# Patient Record
Sex: MALE | Race: AMERICAN INDIAN OR ALASKA NATIVE | URBAN - METROPOLITAN AREA
[De-identification: names, ages, dates, MRNs, and addresses within clinical notes are randomized per-mention and may not be internally consistent; named-entity substitution may affect disease eponyms.]

---

## 2017-04-10 ENCOUNTER — TRANSFERRED RECORDS (OUTPATIENT)
Dept: HEALTH INFORMATION MANAGEMENT | Facility: CLINIC | Age: 27
End: 2017-04-10

## 2017-05-09 ENCOUNTER — TRANSFERRED RECORDS (OUTPATIENT)
Dept: HEALTH INFORMATION MANAGEMENT | Facility: CLINIC | Age: 27
End: 2017-05-09

## 2017-05-10 ENCOUNTER — TRANSFERRED RECORDS (OUTPATIENT)
Dept: HEALTH INFORMATION MANAGEMENT | Facility: CLINIC | Age: 27
End: 2017-05-10

## 2017-06-06 ENCOUNTER — TRANSFERRED RECORDS (OUTPATIENT)
Dept: HEALTH INFORMATION MANAGEMENT | Facility: CLINIC | Age: 27
End: 2017-06-06

## 2017-06-29 ENCOUNTER — MEDICAL CORRESPONDENCE (OUTPATIENT)
Dept: HEALTH INFORMATION MANAGEMENT | Facility: CLINIC | Age: 27
End: 2017-06-29

## 2017-06-29 ENCOUNTER — TRANSFERRED RECORDS (OUTPATIENT)
Dept: HEALTH INFORMATION MANAGEMENT | Facility: CLINIC | Age: 27
End: 2017-06-29

## 2017-07-12 ENCOUNTER — PRE VISIT (OUTPATIENT)
Dept: OTOLARYNGOLOGY | Facility: CLINIC | Age: 27
End: 2017-07-12

## 2017-07-12 NOTE — TELEPHONE ENCOUNTER
1.  Date/reason for appt:  7/18/17   Tympanic Membrane    2.  Referring provider:  Dr Killian, [facility]    3.  Call to patient (Yes / No - short description):  No, referred    4.  Previous care at / records requested from:  [facility] Records/Imaging

## 2017-07-13 NOTE — TELEPHONE ENCOUNTER
Records received from [facility].   Included  Office notes: 5/17/17    Missing/needed records: additional records, audiogram and images

## 2017-07-14 NOTE — TELEPHONE ENCOUNTER
Records received from [facility].   Included  Office notes: 11/25/16, 4/11/17, 5/10/17, 5/9/17, 6/6/17  Radiology reports: CT IAC and Temporal bones on 5/10/17  Other: audiogram on 5/9/17

## 2017-07-17 DIAGNOSIS — H90.8 MIXED HEARING LOSS: Primary | ICD-10-CM

## 2017-07-18 ENCOUNTER — OFFICE VISIT (OUTPATIENT)
Dept: AUDIOLOGY | Facility: CLINIC | Age: 27
End: 2017-07-18

## 2017-07-18 ENCOUNTER — OFFICE VISIT (OUTPATIENT)
Dept: OTOLARYNGOLOGY | Facility: CLINIC | Age: 27
End: 2017-07-18

## 2017-07-18 VITALS — HEIGHT: 76 IN | WEIGHT: 315 LBS | BODY MASS INDEX: 38.36 KG/M2

## 2017-07-18 DIAGNOSIS — H90.8 MIXED CONDUCTIVE AND SENSORINEURAL HEARING LOSS, UNSPECIFIED LATERALITY: Primary | ICD-10-CM

## 2017-07-18 DIAGNOSIS — H90.12 CONDUCTIVE HEARING LOSS OF LEFT EAR WITH UNRESTRICTED HEARING OF RIGHT EAR: Primary | ICD-10-CM

## 2017-07-18 DIAGNOSIS — H91.90 HEARING LOSS: Primary | ICD-10-CM

## 2017-07-18 DIAGNOSIS — H72.90 TYMPANIC MEMBRANE PERFORATION, UNSPECIFIED LATERALITY: ICD-10-CM

## 2017-07-18 ASSESSMENT — PAIN SCALES - GENERAL: PAINLEVEL: NO PAIN (0)

## 2017-07-18 NOTE — LETTER
7/18/2017      RE: Alex Morrow  [address]  [facility] [address]       I had the pleasure of meeting Alex Morrow in consultation today at the [facility] at your request.    HISTORY OF PRESENT ILLNESS: Mr. Morrow is a 26-year-old male who presents with a perforated tympanic membrane.    Mr. Morrow is a 26-year-old male who presents with a long history of ear infections as a kid, including placement of ear tubes. He had not had infections for many years though he felt like his hearing was getting worse in his left than his right. This has been going on for maybe a year, and he also noted during the same time that he can no longer pop his left ear. He has only gotten in the habit of popping his ears recently, and did not have to do this several years ago.     He went to the doctor three months ago due to the decreased hearing on the left. He was then noted to have a large left perforation and copious purulent drainage. This was cultured and came back as MRSA. He was put on drops and Bactrim and was told to return in several months for surgery. He is referred to us as his surgeon is on temporary leave.    He denies any surgeries on his ears other than PE tubes. He had tinnitus at the time of infection but does not have it now. Denies otalgia, otorrhea currently, vertigo, facial weakness, headaches, fevers/chills.     No past medical history on file. Denies HTN, DM    No past surgical history on file. Hip pin for SCFE    ALLERGIES:  No Known Allergies    No current outpatient prescriptions on file.     No current facility-administered medications for this visit.        SOCIAL HISTORY:  Lives in [place], [place]. Works at the [employer] near there as a . Enjoys fishing. He has eight month old female twins.    Alcohol use Yes    History   Smoking Status     Current Every Day Smoker     Types: Cigarettes   Smokeless Tobacco     Not on file         FAMILY HISTORY: No family  history of hearing loss, dad has diabetes     REVIEW OF SYSTEMS:   ENT ROS 7/18/2017   Ears, Nose, Throat Hearing loss       PHYSICIAL EXAMINATION:  Constitutional: The patient was well-groomed and in no acute distress.   Skin: Warm and pink.  Psychiatric: The patient's affect was calm, cooperative, and appropriate.   Respiratory: Breathing comfortably without stridor or exertion of accessory muscles.  Eyes: Pupils were equal and reactive. Extraocular movement intact.   Head: Normocephalic and atraumatic. No lesions or scars.  Ears: The ears were examined under the otomicroscope. Right EAC is normal with monomeric, atelectatic drum. No perforation noted. Left EAC with some cerumen at inferior canal, there is 80% annular TM perforation of the anterior and middle drum with a sliver of the posterior-superior drum still intact. There is a very thin rim of annular drum left at the anterior portion but there is no annular drum inferiorly. Malleus head is visible, there is myringostapediopexy. The IS joint appears atrophic and fibrous. Middle ear mucosa is healthy, there are scar bands in middle ear.   Chaney lateralizes left, rinne is air>bone on right and reverses on left.  Nose: No anterior drainage.    Lymphatic: There is no palpable lymphadenopathy or other masses in the neck.   Neurologic: Alert and oriented x 3. Cranial nerves III-XI within normal limits. Voice quality normal.  Vestibular examination: No spontaneous or gaze evoked nystagmus.  Normal gait.    Audiogram:  Normal hearing in right, mild hearing loss in left with air-bone gap. 100% word recognition score bilaterally.     Imaging: Non-contrast CT scan of the temporal bone 05/10/2017  There is debris in the left external auditory canal and middle ear with possible disarticulation of the incudostapedial joint.    IMPRESSION AND PLAN: This is a pleasant 26-year-old male with a history of chronic eustachian tube dysfunction with large left ear perforation  and recent MRSA infection of that ear. His hearing loss on the left is likely due to a combination of perforation of the tympanic membrane and disarticulation of the ossicular chain. He will require reconstruction of the left tympanic membrane with a cartilage graft, and possibly a PORP versus bone cement to recreate the IS joint.     Due to his history of MRSA in that ear, we will give Vancomycin as shefali-operative antibiotic, and send him home on bactrim. He will also see his primary care physician for a pre-op H&P and full diabetes work up.     Thank you very much for the opportunity to participate in the care of your patient.    Danna Killian MD  Otolaryngology PGY2    KM/ms    I, Honey Neil, saw this patient with the resident/fellow and agree with the resident s findings and plan of care as documented in the resident s/fellow s note. I was present for the entire procedure.      Honey Neil MD

## 2017-07-18 NOTE — PROGRESS NOTES
AUDIOLOGY REPORT    SUMMARY: Audiology visit completed. See audiogram for results.      RECOMMENDATIONS: Follow-up with ENT.      Gayle Jeff, CCC-A  Licensed Audiologist  [place] #[id number]

## 2017-07-18 NOTE — MR AVS SNAPSHOT
After Visit Summary   7/18/2017    Alex Morrow    MRN: [id number]           Patient Information     Date Of Birth          1990        Visit Information        Provider Department      7/18/2017 10:00 AM Honey Neil MD [facility] Ear Nose and Throat        Care Instructions    1.  Petra will call you with a surgery date & time.  2.  In the meantime, you need to schedule a consultation with your primary MD to rule out diabetes.    -We can not proceed with surgery until this consult has been performed.  3.  Surgery teaching has been performed, scrub and packet has been given.  4.  Please call our office with additional questions or concerns: [phone], option #3.            Follow-ups after your visit        Future tests that were ordered for you today     Open Future Orders        Priority Expected Expires Ordered    Hearing Test, Comprehensive (Audiogram) (22563) Routine  1/14/2018 7/18/2017            Who to contact     Please call your clinic at [phone] to:    Ask questions about your health    Make or cancel appointments    Discuss your medicines    Learn about your test results    Speak to your doctor   If you have compliments or concerns about an experience at your clinic, or if you wish to file a complaint, please contact [facility] Patient Relations at [phone] or email us at [email or web address]         Additional Information About Your Visit        MyChart Information     Streyner is an electronic gateway that provides easy, online access to your medical records. With Streyner, you can request a clinic appointment, read your test results, renew a prescription or communicate with your care team.     To sign up for Streyner visit the website at [email or web address]   You will be asked to enter the access code listed below, as well as some personal information. Please follow the directions to create your username and  "password.     Your access code is: [email or web address]  Expires: 10/11/2017  6:31 AM     Your access code will  in 90 days. If you need help or a new code, please contact your [facility] or call [phone] for assistance.        Care EveryWhere ID     This is your Care EveryWhere ID. This could be used by other organizations to access your [facility] medical records  [id number]        Your Vitals Were     Height BMI (Body Mass Index)                1.918 m (6' 3.5\") 40.95 kg/m2           Blood Pressure from Last 3 Encounters:   No data found for BP    Weight from Last 3 Encounters:   17 (!) 150.6 kg (332 lb)              Today, you had the following     No orders found for display       Primary Care Provider    Pcp Unknown Verified       No address on file        Equal Access to Services     FLORENTINO KIM : Hadii fortion monteroo Sorosalind, waaxda luqadaha, qaybta kaalmada lisa, marleny baldwin . So [facility] [phone].    ATENCIÓN: Si habla español, tiene a dee disposición servicios gratuitos de asistencia lingüística. Llame al [phone].    We comply with applicable federal civil rights laws and [place] laws. We do not discriminate on the basis of race, color, national origin, age, disability sex, sexual orientation or gender identity.            Thank you!     Thank you for choosing [facility] [place] [facility] EAR NOSE AND THROAT  for your care. Our goal is always to provide you with excellent care. Hearing back from our patients is one way we can continue to improve our services. Please take a few minutes to complete the written survey that you may receive in the mail after your visit with us. Thank you!             Your Updated Medication List - Protect others around you: Learn how to safely use, store and throw away your medicines at www.disposemymeds.org.      Notice  As of 2017 11:03 AM    You have not been prescribed any medications.      "

## 2017-07-18 NOTE — MR AVS SNAPSHOT
After Visit Summary   2017    Alex Morrow    MRN: [id number]           Patient Information     Date Of Birth          1990        Visit Information        Provider Department      2017 8:30 AM Sidra Menendez AuD [facility] Audiology        Today's Diagnoses     Conductive hearing loss of left ear with unrestricted hearing of right ear    -  1       Follow-ups after your visit        Your next 10 appointments already scheduled     2017 10:00 AM CDT   (Arrive by 9:45 AM)   NEW NEUROTOLOGY VISIT with MD YURI Sharma [facility] Ear Nose and Throat ([facility])    [address] [place] [id number] [phone]              Who to contact     Please call your clinic at [phone] to:    Ask questions about your health    Make or cancel appointments    Discuss your medicines    Learn about your test results    Speak to your doctor   If you have compliments or concerns about an experience at your clinic, or if you wish to file a complaint, please contact [facility] Patient Relations at [phone] or email us at [email or web address]         Additional Information About Your Visit        MyChart Information     1d4 Ptyt is an electronic gateway that provides easy, online access to your medical records. With [employer], you can request a clinic appointment, read your test results, renew a prescription or communicate with your care team.     To sign up for 1d[employer] Ptyt visit the website at [email or web address]   You will be asked to enter the access code listed below, as well as some personal information. Please follow the directions to create your username and password.     Your access code is: [email or web address]  Expires: 10/11/2017  6:31 AM     Your access code will  in 90 days. If you need help or a new code, please contact your [facility] or call  [phone] for assistance.        Care EveryWhere ID     This is your Care EveryWhere ID. This could be used by other organizations to access your [facility] medical records  [id number]         Blood Pressure from Last 3 Encounters:   No data found for BP    Weight from Last 3 Encounters:   No data found for Wt              We Performed the Following     Cmpn Audiometry Thrshld Eval & Speech Recog (12826)     Reduced 52 - Tympanometry (impedance - testing)   (53467)        Primary Care Provider    Pcp Unknown Verified       No address on file        Equal Access to Services     FLORENTINO KIM : Hadii aad ku hadasho Soomaali, waaxda luqadaha, qaybta kaalmada adeegyada, waxay idiin hayaan adeeg kharaalfreda baldwin . So [facility] [phone].    ATENCIÓN: Si habla español, tiene a dee disposición servicios gratuitos de asistencia lingüística. Llame al [phone].    We comply with applicable federal civil rights laws and [place] laws. We do not discriminate on the basis of race, color, national origin, age, disability sex, sexual orientation or gender identity.            Thank you!     Thank you for choosing [facility] AUDIOLOGY  for your care. Our goal is always to provide you with excellent care. Hearing back from our patients is one way we can continue to improve our services. Please take a few minutes to complete the written survey that you may receive in the mail after your visit with us. Thank you!             Your Updated Medication List - Protect others around you: Learn how to safely use, store and throw away your medicines at www.disposemymeds.org.      Notice  As of 7/18/2017  9:18 AM    You have not been prescribed any medications.

## 2017-07-18 NOTE — LETTER
Date:July 28, 2017      Patient was self referred, no letter generated. Do not send.        [facility] Health Information

## 2017-07-18 NOTE — LETTER
Date:July 28, 2017      Patient was self referred, no letter generated. Do not send.        [facility] Health Information

## 2017-07-18 NOTE — LETTER
7/18/2017       RE: Alex Morrow  [address]  [facility] [address]     Dear Colleague,    Thank you for referring your patient, Alex Morrow, to the [facility] EAR NOSE AND THROAT at [facility]. Please see a copy of my visit note below.    I had the pleasure of meeting Alex Morrow in consultation today at the [facility] at your request.    HISTORY OF PRESENT ILLNESS: Mr. Morrow is a 26-year-old male who presents with a perforated tympanic membrane.    Mr. Morrow is a 26-year-old male who presents with a long history of ear infections as a kid, including placement of ear tubes. He had not had infections for many years though he felt like his hearing was getting worse in his left than his right. This has been going on for maybe a year, and he also noted during the same time that he can no longer pop his left ear. He has only gotten in the habit of popping his ears recently, and did not have to do this several years ago.     He went to the doctor three months ago due to the decreased hearing on the left. He was then noted to have a large left perforation and copious purulent drainage. This was cultured and came back as MRSA. He was put on drops and Bactrim and was told to return in several months for surgery. He is referred to us as his surgeon is on temporary leave.    He denies any surgeries on his ears other than PE tubes. He had tinnitus at the time of infection but does not have it now. Denies otalgia, otorrhea currently, vertigo, facial weakness, headaches, fevers/chills.     No past medical history on file. Denies HTN, DM    No past surgical history on file. Hip pin for SCFE    ALLERGIES:  No Known Allergies    No current outpatient prescriptions on file.     No current facility-administered medications for this visit.        SOCIAL HISTORY:  Lives in [place], [place]. Works at the [employer] near there as a . Enjoys fishing. He  has eight month old female twins.    Alcohol use Yes    History   Smoking Status     Current Every Day Smoker     Types: Cigarettes   Smokeless Tobacco     Not on file         FAMILY HISTORY: No family history of hearing loss, dad has diabetes     REVIEW OF SYSTEMS:   ENT ROS 7/18/2017   Ears, Nose, Throat Hearing loss       PHYSICIAL EXAMINATION:  Constitutional: The patient was well-groomed and in no acute distress.   Skin: Warm and pink.  Psychiatric: The patient's affect was calm, cooperative, and appropriate.   Respiratory: Breathing comfortably without stridor or exertion of accessory muscles.  Eyes: Pupils were equal and reactive. Extraocular movement intact.   Head: Normocephalic and atraumatic. No lesions or scars.  Ears: The ears were examined under the otomicroscope. Right EAC is normal with monomeric, atelectatic drum. No perforation noted. Left EAC with some cerumen at inferior canal, there is 80% annular TM perforation of the anterior and middle drum with a sliver of the posterior-superior drum still intact. There is a very thin rim of annular drum left at the anterior portion but there is no annular drum inferiorly. Malleus head is visible, there is myringostapediopexy. The IS joint appears atrophic and fibrous. Middle ear mucosa is healthy, there are scar bands in middle ear.   Chaney lateralizes left, rinne is air>bone on right and reverses on left.  Nose: No anterior drainage.    Lymphatic: There is no palpable lymphadenopathy or other masses in the neck.   Neurologic: Alert and oriented x 3. Cranial nerves III-XI within normal limits. Voice quality normal.  Vestibular examination: No spontaneous or gaze evoked nystagmus.  Normal gait.    Audiogram:  Normal hearing in right, mild hearing loss in left with air-bone gap. 100% word recognition score bilaterally.     Imaging: Non-contrast CT scan of the temporal bone 05/10/2017  There is debris in the left external auditory canal and middle ear with  possible disarticulation of the incudostapedial joint.    IMPRESSION AND PLAN: This is a pleasant 26-year-old male with a history of chronic eustachian tube dysfunction with large left ear perforation and recent MRSA infection of that ear. His hearing loss on the left is likely due to a combination of perforation of the tympanic membrane and disarticulation of the ossicular chain. He will require reconstruction of the left tympanic membrane with a cartilage graft, and possibly a PORP versus bone cement to recreate the IS joint.     Due to his history of MRSA in that ear, we will give Vancomycin as shefali-operative antibiotic, and send him home on bactrim. He will also see his primary care physician for a pre-op H&P and full diabetes work up.     Thank you very much for the opportunity to participate in the care of your patient.    Danna Killian MD  Otolaryngology PGY2    KM/ms    I, Honey Neil, saw this patient with the resident/fellow and agree with the resident s findings and plan of care as documented in the resident s/fellow s note. I was present for the entire procedure.      Again, thank you for allowing me to participate in the care of your patient.      Sincerely,    Honey Neil MD

## 2017-07-18 NOTE — PATIENT INSTRUCTIONS
1.  Petra will call you with a surgery date & time.  2.  In the meantime, you need to schedule a consultation with your primary MD to rule out diabetes.    -We can not proceed with surgery until this consult has been performed.  3.  Surgery teaching has been performed, scrub and packet has been given.  4.  Please call our office with additional questions or concerns: [phone], option #3.

## 2017-07-18 NOTE — NURSING NOTE
Chief Complaint   Patient presents with     Consult     perforated tympanic membrane     Frances Maguire Medical Assistant

## 2017-07-18 NOTE — PROGRESS NOTES
I had the pleasure of meeting Alex Morrow in consultation today at the [facility] at your request.    HISTORY OF PRESENT ILLNESS: Mr. Morrow is a 26-year-old male who presents with a perforated tympanic membrane.    Mr. Morrow is a 26-year-old male who presents with a long history of ear infections as a kid, including placement of ear tubes. He had not had infections for many years though he felt like his hearing was getting worse in his left than his right. This has been going on for maybe a year, and he also noted during the same time that he can no longer pop his left ear. He has only gotten in the habit of popping his ears recently, and did not have to do this several years ago.     He went to the doctor three months ago due to the decreased hearing on the left. He was then noted to have a large left perforation and copious purulent drainage. This was cultured and came back as MRSA. He was put on drops and Bactrim and was told to return in several months for surgery. He is referred to us as his surgeon is on temporary leave.    He denies any surgeries on his ears other than PE tubes. He had tinnitus at the time of infection but does not have it now. Denies otalgia, otorrhea currently, vertigo, facial weakness, headaches, fevers/chills.     No past medical history on file. Denies HTN, DM    No past surgical history on file. Hip pin for SCFE    ALLERGIES:  No Known Allergies    No current outpatient prescriptions on file.     No current facility-administered medications for this visit.        SOCIAL HISTORY:  Lives in [place], [place]. Works at the [employer] near there as a . Enjoys fishing. He has eight month old female twins.    Alcohol use Yes    History   Smoking Status     Current Every Day Smoker     Types: Cigarettes   Smokeless Tobacco     Not on file         FAMILY HISTORY: No family history of hearing loss, dad has diabetes     REVIEW OF SYSTEMS:   ENT  ROS 7/18/2017   Ears, Nose, Throat Hearing loss       PHYSICIAL EXAMINATION:  Constitutional: The patient was well-groomed and in no acute distress.   Skin: Warm and pink.  Psychiatric: The patient's affect was calm, cooperative, and appropriate.   Respiratory: Breathing comfortably without stridor or exertion of accessory muscles.  Eyes: Pupils were equal and reactive. Extraocular movement intact.   Head: Normocephalic and atraumatic. No lesions or scars.  Ears: The ears were examined under the otomicroscope. Right EAC is normal with monomeric, atelectatic drum. No perforation noted. Left EAC with some cerumen at inferior canal, there is 80% annular TM perforation of the anterior and middle drum with a sliver of the posterior-superior drum still intact. There is a very thin rim of annular drum left at the anterior portion but there is no annular drum inferiorly. Malleus head is visible, there is myringostapediopexy. The IS joint appears atrophic and fibrous. Middle ear mucosa is healthy, there are scar bands in middle ear.   Chaney lateralizes left, rinne is air>bone on right and reverses on left.  Nose: No anterior drainage.    Lymphatic: There is no palpable lymphadenopathy or other masses in the neck.   Neurologic: Alert and oriented x 3. Cranial nerves III-XI within normal limits. Voice quality normal.  Vestibular examination: No spontaneous or gaze evoked nystagmus.  Normal gait.    Audiogram:  Normal hearing in right, mild hearing loss in left with air-bone gap. 100% word recognition score bilaterally.     Imaging: Non-contrast CT scan of the temporal bone 05/10/2017  There is debris in the left external auditory canal and middle ear with possible disarticulation of the incudostapedial joint.    IMPRESSION AND PLAN: This is a pleasant 26-year-old male with a history of chronic eustachian tube dysfunction with large left ear perforation and recent MRSA infection of that ear. His hearing loss on the left is  likely due to a combination of perforation of the tympanic membrane and disarticulation of the ossicular chain. He will require reconstruction of the left tympanic membrane with a cartilage graft, and possibly a PORP versus bone cement to recreate the IS joint.     Due to his history of MRSA in that ear, we will give Vancomycin as shefali-operative antibiotic, and send him home on bactrim. He will also see his primary care physician for a pre-op H&P and full diabetes work up.     Thank you very much for the opportunity to participate in the care of your patient.    Danna Killian MD  Otolaryngology PGY2    KM/ms    I, Honey Neil, saw this patient with the resident/fellow and agree with the resident s findings and plan of care as documented in the resident s/fellow s note. I was present for the entire procedure.

## 2017-07-25 ENCOUNTER — TRANSFERRED RECORDS (OUTPATIENT)
Dept: HEALTH INFORMATION MANAGEMENT | Facility: CLINIC | Age: 27
End: 2017-07-25

## 2017-07-26 ENCOUNTER — TRANSFERRED RECORDS (OUTPATIENT)
Dept: HEALTH INFORMATION MANAGEMENT | Facility: CLINIC | Age: 27
End: 2017-07-26

## 2017-08-08 ENCOUNTER — TELEPHONE (OUTPATIENT)
Dept: OTOLARYNGOLOGY | Facility: CLINIC | Age: 27
End: 2017-08-08

## 2017-08-08 DIAGNOSIS — H72.90 PERFORATED EAR DRUM: Primary | ICD-10-CM

## 2017-08-08 NOTE — TELEPHONE ENCOUNTER
-Dr. Neil reviewed clinic notes from the patient's visit with his primary, Dr. Martinez on 7-25-17.  -Dr. Neil also received the patient's A1C results from that day as well (within normal limits).  -Per the note and A1C result, the plan will be to have the patient proceed with surgery.   -Left cartilage tympanoplasty, possible PORP with diode laser.  -The surgery order was placed in [facility].  -Petra, our surgery scheduler, was made aware and will call the patient soon with a date & time of surgery.  -Surgery teaching was performed at the last clinic visit.  (scrub and packet given then too).    -This information was discussed with the patient & we talked about the need for a repeat pre-op, which needs to be done within 30 days of the procedure.  -Patient will coordinate the pre-op with Dr. Martinez depending on surgical date.  -Patient encouraged to call our office with additional questions or concerns: [phone], option #3.      Lalita Morales RN  8/8/2017 12:49 PM

## 2017-10-30 ENCOUNTER — APPOINTMENT (OUTPATIENT)
Dept: SURGERY | Facility: CLINIC | Age: 27
End: 2017-10-30

## 2017-10-31 ENCOUNTER — ANESTHESIA EVENT (OUTPATIENT)
Dept: SURGERY | Facility: AMBULATORY SURGERY CENTER | Age: 27
End: 2017-10-31

## 2017-11-01 ENCOUNTER — HOSPITAL ENCOUNTER (OUTPATIENT)
Facility: AMBULATORY SURGERY CENTER | Age: 27
End: 2017-11-01
Attending: OTOLARYNGOLOGY

## 2017-11-01 ENCOUNTER — ANESTHESIA (OUTPATIENT)
Dept: SURGERY | Facility: AMBULATORY SURGERY CENTER | Age: 27
End: 2017-11-01

## 2017-11-01 ENCOUNTER — SURGERY (OUTPATIENT)
Age: 27
End: 2017-11-01

## 2017-11-01 VITALS
OXYGEN SATURATION: 95 % | SYSTOLIC BLOOD PRESSURE: 125 MMHG | DIASTOLIC BLOOD PRESSURE: 81 MMHG | HEART RATE: 74 BPM | HEIGHT: 76 IN | BODY MASS INDEX: 38.36 KG/M2 | TEMPERATURE: 99.2 F | RESPIRATION RATE: 18 BRPM | WEIGHT: 315 LBS

## 2017-11-01 DIAGNOSIS — G89.18 POSTOPERATIVE PAIN: Primary | ICD-10-CM

## 2017-11-01 DIAGNOSIS — Z98.890 POSTOPERATIVE STATE: ICD-10-CM

## 2017-11-01 RX ORDER — ONDANSETRON 2 MG/ML
4 INJECTION INTRAMUSCULAR; INTRAVENOUS EVERY 30 MIN PRN
Status: DISCONTINUED | OUTPATIENT
Start: 2017-11-01 | End: 2017-11-02 | Stop reason: HOSPADM

## 2017-11-01 RX ORDER — MEPERIDINE HYDROCHLORIDE 25 MG/ML
12.5 INJECTION INTRAMUSCULAR; INTRAVENOUS; SUBCUTANEOUS
Status: DISCONTINUED | OUTPATIENT
Start: 2017-11-01 | End: 2017-11-02 | Stop reason: HOSPADM

## 2017-11-01 RX ORDER — SODIUM CHLORIDE, SODIUM LACTATE, POTASSIUM CHLORIDE, CALCIUM CHLORIDE 600; 310; 30; 20 MG/100ML; MG/100ML; MG/100ML; MG/100ML
INJECTION, SOLUTION INTRAVENOUS CONTINUOUS
Status: DISCONTINUED | OUTPATIENT
Start: 2017-11-01 | End: 2017-11-02 | Stop reason: HOSPADM

## 2017-11-01 RX ORDER — PROPOFOL 10 MG/ML
INJECTION, EMULSION INTRAVENOUS CONTINUOUS PRN
Status: DISCONTINUED | OUTPATIENT
Start: 2017-11-01 | End: 2017-11-01

## 2017-11-01 RX ORDER — ACETAMINOPHEN 325 MG/1
975 TABLET ORAL ONCE
Status: COMPLETED | OUTPATIENT
Start: 2017-11-01 | End: 2017-11-01

## 2017-11-01 RX ORDER — LIDOCAINE HYDROCHLORIDE 20 MG/ML
INJECTION, SOLUTION INFILTRATION; PERINEURAL PRN
Status: DISCONTINUED | OUTPATIENT
Start: 2017-11-01 | End: 2017-11-01

## 2017-11-01 RX ORDER — OFLOXACIN 3 MG/ML
SOLUTION/ DROPS OPHTHALMIC PRN
Status: DISCONTINUED | OUTPATIENT
Start: 2017-11-01 | End: 2017-11-01 | Stop reason: HOSPADM

## 2017-11-01 RX ORDER — FENTANYL CITRATE 50 UG/ML
25-50 INJECTION, SOLUTION INTRAMUSCULAR; INTRAVENOUS
Status: DISCONTINUED | OUTPATIENT
Start: 2017-11-01 | End: 2017-11-01 | Stop reason: HOSPADM

## 2017-11-01 RX ORDER — NALOXONE HYDROCHLORIDE 0.4 MG/ML
.1-.4 INJECTION, SOLUTION INTRAMUSCULAR; INTRAVENOUS; SUBCUTANEOUS
Status: DISCONTINUED | OUTPATIENT
Start: 2017-11-01 | End: 2017-11-02 | Stop reason: HOSPADM

## 2017-11-01 RX ORDER — LIDOCAINE 40 MG/G
CREAM TOPICAL
Status: DISCONTINUED | OUTPATIENT
Start: 2017-11-01 | End: 2017-11-01 | Stop reason: HOSPADM

## 2017-11-01 RX ORDER — ONDANSETRON 4 MG/1
4 TABLET, ORALLY DISINTEGRATING ORAL EVERY 30 MIN PRN
Status: DISCONTINUED | OUTPATIENT
Start: 2017-11-01 | End: 2017-11-02 | Stop reason: HOSPADM

## 2017-11-01 RX ORDER — GABAPENTIN 300 MG/1
300 CAPSULE ORAL ONCE
Status: COMPLETED | OUTPATIENT
Start: 2017-11-01 | End: 2017-11-01

## 2017-11-01 RX ORDER — EPHEDRINE SULFATE 50 MG/ML
INJECTION, SOLUTION INTRAMUSCULAR; INTRAVENOUS; SUBCUTANEOUS PRN
Status: DISCONTINUED | OUTPATIENT
Start: 2017-11-01 | End: 2017-11-01

## 2017-11-01 RX ORDER — SODIUM CHLORIDE, SODIUM LACTATE, POTASSIUM CHLORIDE, CALCIUM CHLORIDE 600; 310; 30; 20 MG/100ML; MG/100ML; MG/100ML; MG/100ML
INJECTION, SOLUTION INTRAVENOUS CONTINUOUS
Status: DISCONTINUED | OUTPATIENT
Start: 2017-11-01 | End: 2017-11-01 | Stop reason: HOSPADM

## 2017-11-01 RX ORDER — VANCOMYCIN HYDROCHLORIDE 1 G/200ML
1000 INJECTION, SOLUTION INTRAVENOUS
Status: COMPLETED | OUTPATIENT
Start: 2017-11-01 | End: 2017-11-01

## 2017-11-01 RX ORDER — KETAMINE HYDROCHLORIDE 10 MG/ML
INJECTION, SOLUTION INTRAMUSCULAR; INTRAVENOUS PRN
Status: DISCONTINUED | OUTPATIENT
Start: 2017-11-01 | End: 2017-11-01

## 2017-11-01 RX ORDER — AMOXICILLIN 250 MG
1-2 CAPSULE ORAL 2 TIMES DAILY PRN
Qty: 30 TABLET | Refills: 0 | Status: SHIPPED | OUTPATIENT
Start: 2017-11-01

## 2017-11-01 RX ORDER — PROPOFOL 10 MG/ML
INJECTION, EMULSION INTRAVENOUS PRN
Status: DISCONTINUED | OUTPATIENT
Start: 2017-11-01 | End: 2017-11-01

## 2017-11-01 RX ORDER — DEXAMETHASONE SODIUM PHOSPHATE 10 MG/ML
INJECTION, SOLUTION INTRAMUSCULAR; INTRAVENOUS PRN
Status: DISCONTINUED | OUTPATIENT
Start: 2017-11-01 | End: 2017-11-01

## 2017-11-01 RX ORDER — HYDROCODONE BITARTRATE AND ACETAMINOPHEN 5; 325 MG/1; MG/1
1-2 TABLET ORAL EVERY 4 HOURS PRN
Qty: 15 TABLET | Refills: 0 | Status: SHIPPED | OUTPATIENT
Start: 2017-11-01 | End: 2017-11-01

## 2017-11-01 RX ORDER — ONDANSETRON 2 MG/ML
INJECTION INTRAMUSCULAR; INTRAVENOUS PRN
Status: DISCONTINUED | OUTPATIENT
Start: 2017-11-01 | End: 2017-11-01

## 2017-11-01 RX ORDER — EPINEPHRINE NASAL SOLUTION 1 MG/ML
SOLUTION NASAL PRN
Status: DISCONTINUED | OUTPATIENT
Start: 2017-11-01 | End: 2017-11-01 | Stop reason: HOSPADM

## 2017-11-01 RX ORDER — SCOLOPAMINE TRANSDERMAL SYSTEM 1 MG/1
1 PATCH, EXTENDED RELEASE TRANSDERMAL
Status: DISCONTINUED | OUTPATIENT
Start: 2017-11-01 | End: 2017-11-01 | Stop reason: HOSPADM

## 2017-11-01 RX ORDER — LIDOCAINE HYDROCHLORIDE AND EPINEPHRINE 10; 10 MG/ML; UG/ML
INJECTION, SOLUTION INFILTRATION; PERINEURAL PRN
Status: DISCONTINUED | OUTPATIENT
Start: 2017-11-01 | End: 2017-11-01 | Stop reason: HOSPADM

## 2017-11-01 RX ORDER — SULFAMETHOXAZOLE/TRIMETHOPRIM 800-160 MG
1 TABLET ORAL 2 TIMES DAILY
Qty: 14 TABLET | Refills: 0 | Status: SHIPPED | OUTPATIENT
Start: 2017-11-01

## 2017-11-01 RX ORDER — KETOROLAC TROMETHAMINE 30 MG/ML
30 INJECTION, SOLUTION INTRAMUSCULAR; INTRAVENOUS EVERY 6 HOURS PRN
Status: DISCONTINUED | OUTPATIENT
Start: 2017-11-01 | End: 2017-11-02 | Stop reason: HOSPADM

## 2017-11-01 RX ORDER — HYDROCODONE BITARTRATE AND ACETAMINOPHEN 5; 325 MG/1; MG/1
1-2 TABLET ORAL EVERY 4 HOURS PRN
Qty: 30 TABLET | Refills: 0 | Status: SHIPPED | OUTPATIENT
Start: 2017-11-01

## 2017-11-01 RX ADMIN — PROPOFOL: 10 INJECTION, EMULSION INTRAVENOUS at 10:52

## 2017-11-01 RX ADMIN — PROPOFOL: 10 INJECTION, EMULSION INTRAVENOUS at 07:58

## 2017-11-01 RX ADMIN — PROPOFOL: 10 INJECTION, EMULSION INTRAVENOUS at 10:34

## 2017-11-01 RX ADMIN — ONDANSETRON 4 MG: 2 INJECTION INTRAMUSCULAR; INTRAVENOUS at 07:28

## 2017-11-01 RX ADMIN — DEXAMETHASONE SODIUM PHOSPHATE 4 MG: 10 INJECTION, SOLUTION INTRAMUSCULAR; INTRAVENOUS at 07:28

## 2017-11-01 RX ADMIN — PROPOFOL 200 MG: 10 INJECTION, EMULSION INTRAVENOUS at 07:35

## 2017-11-01 RX ADMIN — PROPOFOL: 10 INJECTION, EMULSION INTRAVENOUS at 11:19

## 2017-11-01 RX ADMIN — LIDOCAINE HYDROCHLORIDE 160 MG: 20 INJECTION, SOLUTION INFILTRATION; PERINEURAL at 07:35

## 2017-11-01 RX ADMIN — SODIUM CHLORIDE, SODIUM LACTATE, POTASSIUM CHLORIDE, CALCIUM CHLORIDE: 600; 310; 30; 20 INJECTION, SOLUTION INTRAVENOUS at 07:28

## 2017-11-01 RX ADMIN — Medication 1 MG: at 10:55

## 2017-11-01 RX ADMIN — PROPOFOL: 10 INJECTION, EMULSION INTRAVENOUS at 08:53

## 2017-11-01 RX ADMIN — EPINEPHRINE NASAL SOLUTION 30 PUFF: 1 SOLUTION NASAL at 09:31

## 2017-11-01 RX ADMIN — ONDANSETRON 4 MG: 2 INJECTION INTRAMUSCULAR; INTRAVENOUS at 11:24

## 2017-11-01 RX ADMIN — GABAPENTIN 300 MG: 300 CAPSULE ORAL at 06:24

## 2017-11-01 RX ADMIN — Medication 200 MCG: at 08:07

## 2017-11-01 RX ADMIN — EPHEDRINE SULFATE 10 MG: 50 INJECTION, SOLUTION INTRAMUSCULAR; INTRAVENOUS; SUBCUTANEOUS at 08:15

## 2017-11-01 RX ADMIN — Medication 200 MCG: at 07:47

## 2017-11-01 RX ADMIN — PROPOFOL: 10 INJECTION, EMULSION INTRAVENOUS at 08:25

## 2017-11-01 RX ADMIN — SODIUM CHLORIDE, SODIUM LACTATE, POTASSIUM CHLORIDE, CALCIUM CHLORIDE: 600; 310; 30; 20 INJECTION, SOLUTION INTRAVENOUS at 10:37

## 2017-11-01 RX ADMIN — KETAMINE HYDROCHLORIDE 50 MG: 10 INJECTION, SOLUTION INTRAMUSCULAR; INTRAVENOUS at 07:51

## 2017-11-01 RX ADMIN — Medication 200 MCG: at 08:02

## 2017-11-01 RX ADMIN — KETAMINE HYDROCHLORIDE 10 MG: 10 INJECTION, SOLUTION INTRAMUSCULAR; INTRAVENOUS at 10:44

## 2017-11-01 RX ADMIN — ACETAMINOPHEN 975 MG: 325 TABLET ORAL at 06:24

## 2017-11-01 RX ADMIN — Medication 150 MCG: at 08:15

## 2017-11-01 RX ADMIN — PROPOFOL: 10 INJECTION, EMULSION INTRAVENOUS at 09:53

## 2017-11-01 RX ADMIN — VANCOMYCIN HYDROCHLORIDE 1000 MG: 1 INJECTION, SOLUTION INTRAVENOUS at 06:35

## 2017-11-01 RX ADMIN — PROPOFOL 200 MCG/KG/MIN: 10 INJECTION, EMULSION INTRAVENOUS at 07:33

## 2017-11-01 RX ADMIN — SCOLOPAMINE TRANSDERMAL SYSTEM 1 PATCH: 1 PATCH, EXTENDED RELEASE TRANSDERMAL at 07:08

## 2017-11-01 RX ADMIN — EPHEDRINE SULFATE 10 MG: 50 INJECTION, SOLUTION INTRAMUSCULAR; INTRAVENOUS; SUBCUTANEOUS at 08:07

## 2017-11-01 RX ADMIN — DEXAMETHASONE SODIUM PHOSPHATE 4 MG: 10 INJECTION, SOLUTION INTRAMUSCULAR; INTRAVENOUS at 07:40

## 2017-11-01 RX ADMIN — LIDOCAINE HYDROCHLORIDE AND EPINEPHRINE 3.5 ML: 10; 10 INJECTION, SOLUTION INFILTRATION; PERINEURAL at 11:26

## 2017-11-01 RX ADMIN — OFLOXACIN 2 DROP: 3 SOLUTION/ DROPS OPHTHALMIC at 11:41

## 2017-11-01 NOTE — ANESTHESIA PREPROCEDURE EVALUATION
Anesthesia Evaluation     .             ROS/MED HX    ENT/Pulmonary:  - neg pulmonary ROS     Neurologic:  - neg neurologic ROS     Cardiovascular:  - neg cardiovascular ROS       METS/Exercise Tolerance:     Hematologic:  - neg hematologic  ROS       Musculoskeletal:  - neg musculoskeletal ROS       GI/Hepatic:  - neg GI/hepatic ROS       Renal/Genitourinary:  - ROS Renal section negative       Endo:     (+) Obesity, .      Psychiatric:  - neg psychiatric ROS       Infectious Disease:  - neg infectious disease ROS       Malignancy:      - no malignancy   Other:    - neg other ROS                 Physical Exam  Normal systems: cardiovascular, pulmonary and dental    Airway   Mallampati: I  TM distance: >3 FB  Neck ROM: full    Dental     Cardiovascular   Rhythm and rate: regular and normal      Pulmonary    breath sounds clear to auscultation                    Anesthesia Plan      History & Physical Review  History and physical reviewed and following examination; no interval change.    ASA Status:  2 .    NPO Status:  > 8 hours    Plan for General, ETT and RSI with Intravenous and Propofol induction. Maintenance will be TIVA.    PONV prophylaxis:  Ondansetron (or other 5HT-3), Dexamethasone or Solumedrol and Scopolamine patch       Postoperative Care  Postoperative pain management:  IV analgesics.      Consents  Anesthetic plan, risks, benefits and alternatives discussed with:  Patient.  Use of blood products discussed: No .   .

## 2017-11-01 NOTE — BRIEF OP NOTE
[facility]    Brief Operative Note    Pre-operative diagnosis: Left Ear Perforation  Post-operative diagnosis * No post-op diagnosis entered *  Procedure: Procedure(s):  Left post auricular backed Cartilage Tympanoplasty,  with Diode Laser - Wound Class: II-Clean Contaminated  Surgeon: Surgeon(s) and Role:     * Honey Neil MD - Primary  Anesthesia: General   Estimated blood loss: Less than 10 ml  Drains: None  Specimens: * No specimens in log *  Findings:   See full op report for details.  Complications: None.  Implants: None.

## 2017-11-01 NOTE — ANESTHESIA POSTPROCEDURE EVALUATION
Patient: Domenic Cristhian    Procedure(s):  Left post auricular backed Cartilage Tympanoplasty,  with Diode Laser - Wound Class: II-Clean Contaminated    Diagnosis:Left Ear Perforation  Diagnosis Additional Information: No value filed.    Anesthesia Type:  General, ETT, RSI    Note:  Anesthesia Post Evaluation    Patient location during evaluation: PACU  Patient participation: Able to fully participate in evaluation  Level of consciousness: awake and alert  Pain management: adequate  Airway patency: patent  Cardiovascular status: hemodynamically stable  Respiratory status: acceptable  Hydration status: stable  PONV: none     Anesthetic complications: None          Last vitals:  Vitals:    11/01/17 1211 11/01/17 1215 11/01/17 1230   BP: 134/72 133/75 119/73   Pulse:      Resp: 15 17 27   Temp: 37.7  C (99.8  F) 37.6  C (99.6  F) 37.3  C (99.2  F)   SpO2: 98% 97% 95%         Electronically Signed By: Antwon Hawk MD  November 1, 2017  12:33 PM

## 2017-11-01 NOTE — IP AVS SNAPSHOT
[facility]    [address] [place] [id number]    Phone:  [phone]    Fax:  [phone]                                       After Visit Summary   11/1/2017    Alex Morrow    MRN: [id number]           After Visit Summary Signature Page     I have received my discharge instructions, and my questions have been answered. I have discussed any challenges I see with this plan with the nurse or doctor.    ..........................................................................................................................................  Patient/Patient Representative Signature      ..........................................................................................................................................  Patient Representative Print Name and Relationship to Patient    ..................................................               ................................................  Date                                            Time    ..........................................................................................................................................  Reviewed by Signature/Title    ...................................................              ..............................................  Date                                                            Time

## 2017-11-01 NOTE — ANESTHESIA CARE TRANSFER NOTE
Patient: Domenic Cristhian    Procedure(s):  Left post auricular backed Cartilage Tympanoplasty,  with Diode Laser - Wound Class: II-Clean Contaminated    Diagnosis: Left Ear Perforation  Diagnosis Additional Information: No value filed.    Anesthesia Type:   General, ETT, RSI     Note:  Airway :Face Mask and Oral Airway  Patient transferred to:PACU  Comments: Patient to PACU on 10L O2 via FM with OPA and opens eyes to commands. Report to Rn and transfer of care. BP:134/72 HR: 111 Temp: 99.8 SpO2: 98% on 10L RR: 20      Vitals: (Last set prior to Anesthesia Care Transfer)    CRNA VITALS  11/1/2017 1140 - 11/1/2017 1210      11/1/2017             Resp Rate (set): 10                Electronically Signed By: CHEN Camargo CRNA  November 1, 2017  12:10 PM

## 2017-11-01 NOTE — DISCHARGE INSTRUCTIONS
Scopolamine Patch- (Absorbed through the skin)    This medicine prevents nausea and vomiting caused by motion sickness or anesthesia.  The medicine is in a patch worn behind the ear.      Do NOT use the Scopolamine Patch if you have glaucoma or are allergic to scopolamine.    How to Use This Medicine:    The patch is applied behind the ear.    Keep the patch dry to prevent it from falling off.  Limit contact with water (no bathing or swimming).      If the patch is loose or falls off throw it away.  You do not need to apply a new patch.    After you take off the patch or if it falls off, wash your hands and the area behind your ear with soap and water.      You can remove the patch tomorrow, or leave on for up to 3 days.    Only one patch should be used at any time.    How to Dispose of This Medicine:    Fold the used patch in half with the sticky sides together. Throw any used patch away so that children or pets cannot get to it. You will also need to throw away old patches after the expiration date has passed.    Keep all medicine away from children and never share your medicine with anyone.    Warnings While Using This Medicine:    This medicine can make you sleepy.  Avoid taking sleeping pills and other medicines that can make you sleepy while the patch is on.    Do not drink alcohol while the patch is on.    This medicine can cause temporary blurring and other vision problems if it comes in contact with the eyes.  This is not serious unless accompanied by eye pain and redness.     This medicine may cause problems with urination. If you have problems with urinating, remove the patch.  If you are unable to urinate, call your doctor.      This medicine may make you dizzy or drowsy. Avoid driving, using machines, or doing anything else that could be dangerous if the patch is on.    This medicine may make you sweat less and cause your body to get too hot. Be careful in hot weather or if you are exercising.    Make  sure any doctor or dentist who treats you knows that you have the patch on. This medicine may affect the results of certain medical tests.    Skin burns have been reported at the patch site in several patients wearing an aluminized transdermal system during a magnetic resonance imaging scan (MRI).  Since this patch contains aluminum, it is recommended to remove the patch if you are having an MRI.    Possible Side Effects While Using This Medicine:    Dry mouth    Drowsiness    Temporary blurring of vision and widening of the pupils    Call your doctor right away if you notice any of these side effects:    Allergic reaction: Itching or hives, swelling in your face or hands, swelling or tingling in your mouth or throat, chest tightness, trouble breathing.    Blurred vision that does not go away after the patch is removed    Confusion or memory loss    Fast,slow, or uneven heartbeat    Lightheadedness, dizziness, drowsiness, or fainting    Seeing, hearing, or feeling things that are not there    Restlessness    Severe eye pain    Trouble urinating    If you notice other side effects that you think are caused by this medicine, call your doctor immediately.        [facility]  Home Care Following Anesthesia  For 24 hours after surgery:  1. Get plenty of rest.  A responsible adult must stay with you for at least 24 hours after you leave the surgery center.  2. Do not drive or use heavy equipment.  If you have weakness or tingling, don't drive or use heavy equipment until this feeling goes away.   3. Do not drink alcohol.   4. Avoid strenuous or risky activities.  Ask for help when climbing stairs.  5. You may feel lightheaded.  IF so, sit for a few minutes before standing.  Have someone help you get up.   6. If you have nausea (feel sick to your stomach): Drink only clear liquids such as apple juice, ginger ale, broth or 7-Up.  Rest may also help.  Be sure to drink enough fluids.  Move  to a regular diet as you feel able.   7. You may have a slight fever.  Call the doctor if your fever is over 100 F (37.7 C) (taken under the tongue) or lasts longer than 24 hours.  8. You may have a dry mouth, a sore throat, muscle aches or trouble sleeping. These should go away after 24 hours.  9. Do not make important or legal decisions.               Tips for taking pain medications  To get the best pain relief possible, remember these points:    Take pain medications as directed, before pain becomes severe.    Pain medication can upset your stomach: taking it with food may help.    Constipation is a common side effect of pain medication. Drink plenty of  fluids.    Eat foods high in fiber. Take a stool softener if recommended by your doctor or pharmacist.    Do not drink alcohol, drive or operate machinery while taking pain medications.    Ask about other ways to control pain, such as with heat, ice or relaxation.    Tylenol/Acetaminophen Consumption  To help encourage the safe use of acetaminophen, the makers of TYLENOL  have lowered the maximum daily dose for single-ingredient Extra Strength TYLENOL  (acetaminophen) products sold in the [place] from 8 pills per day (4,000 mg) to 6 pills per day (3,000 mg). The dosing interval has also changed from 2 pills every 4-6 hours to 2 pills every 6 hours.    If you feel your pain relief is insufficient, you may take Tylenol/Acetaminophen in addition to your narcotic pain medication.     Be careful not to exceed 3,000 mg of Tylenol/Acetaminophen in a 24 hour period from all sources.    If you are taking extra strength Tylenol/acetaminophen (500 mg), the maximum dose is 6 tablets in 24 hours.    If you are taking regular strength acetaminophen (325 mg), the maximum dose is 9 tablets in 24 hours.    Call a doctor for any of the followin. Signs of infection (fever, growing tenderness at the surgery site, a large amount of drainage or bleeding, severe pain, foul-smelling  drainage, redness, swelling).  2. It has been over 8 to 10 hours since surgery and you are still not able to urinate (pass water).  3. Headache for over 24 hours.  4. Numbness, tingling or weakness the day after surgery (if you had spinal anesthesia).  Your doctor is:  Dr. Honey Neil, ENT Otolaryngology: [phone]                    Or dial [phone] and ask for the resident on call for:  ENT Otolaryngology  For emergency care, call the:  [facility]:  [phone] (TTY for hearing impaired: [phone])

## 2017-11-01 NOTE — IP AVS SNAPSHOT
MRN:[id number]                      After Visit Summary   11/1/2017    Alex Morrow    MRN: [id number]           Thank you!     Thank you for choosing [facility] for your care. Our goal is always to provide you with excellent care. Hearing back from our patients is one way we can continue to improve our services. Please take a few minutes to complete the written survey that you may receive in the mail after you visit with us. Thank you!        Patient Information     Date Of Birth          1990        About your hospital stay     You were admitted on:  November 1, 2017 [facility]    You were discharged on:  November 1, 2017       Who to Call     For medical emergencies, please call 911.  For non-urgent questions about your medical care, please call your primary care provider or clinic, [phone]  For questions related to your surgery, please call your surgery clinic        Attending Provider     Provider Specialty    Honey Neil MD Otolaryngology       Primary Care Provider Office Phone # Fax #    [facility] [phone]      After Care Instructions     Diet Instructions       Resume pre procedure diet            Discharge Instructions       Patient to follow up with surgeon in 3 weeks            Discharge Instructions - Lifting restrictions       Lifting Restrictions 10 pounds until seen at Post-op follow up appointment            No blowing nose       No straining, no lifting more than 10 pounds, no vigorous activity, sneeze with mouth wide open            No driving or operating machinery       While on narcotic pain medications            Notify Physician        If bleeding or dressing becomes loose or dislodged            Wound care       Keep dressin in place for 48 hours.                  Your next 10 appointments already scheduled     Nov 21, 2017 11:10 AM CST   (Arrive by  10:55 AM)   Return Visit with Honey Neil MD   [facility] ([facility])    [address]  [address] [facility] [address] [phone]              Further instructions from your care team       Scopolamine Patch- (Absorbed through the skin)    This medicine prevents nausea and vomiting caused by motion sickness or anesthesia.  The medicine is in a patch worn behind the ear.      Do NOT use the Scopolamine Patch if you have glaucoma or are allergic to scopolamine.    How to Use This Medicine:    The patch is applied behind the ear.    Keep the patch dry to prevent it from falling off.  Limit contact with water (no bathing or swimming).      If the patch is loose or falls off throw it away.  You do not need to apply a new patch.    After you take off the patch or if it falls off, wash your hands and the area behind your ear with soap and water.      You can remove the patch tomorrow, or leave on for up to 3 days.    Only one patch should be used at any time.    How to Dispose of This Medicine:    Fold the used patch in half with the sticky sides together. Throw any used patch away so that children or pets cannot get to it. You will also need to throw away old patches after the expiration date has passed.    Keep all medicine away from children and never share your medicine with anyone.    Warnings While Using This Medicine:    This medicine can make you sleepy.  Avoid taking sleeping pills and other medicines that can make you sleepy while the patch is on.    Do not drink alcohol while the patch is on.    This medicine can cause temporary blurring and other vision problems if it comes in contact with the eyes.  This is not serious unless accompanied by eye pain and redness.     This medicine may cause problems with urination. If you have problems with urinating, remove the patch.  If you are unable to urinate, call your doctor.      This medicine may  make you dizzy or drowsy. Avoid driving, using machines, or doing anything else that could be dangerous if the patch is on.    This medicine may make you sweat less and cause your body to get too hot. Be careful in hot weather or if you are exercising.    Make sure any doctor or dentist who treats you knows that you have the patch on. This medicine may affect the results of certain medical tests.    Skin burns have been reported at the patch site in several patients wearing an aluminized transdermal system during a magnetic resonance imaging scan (MRI).  Since this patch contains aluminum, it is recommended to remove the patch if you are having an MRI.    Possible Side Effects While Using This Medicine:    Dry mouth    Drowsiness    Temporary blurring of vision and widening of the pupils    Call your doctor right away if you notice any of these side effects:    Allergic reaction: Itching or hives, swelling in your face or hands, swelling or tingling in your mouth or throat, chest tightness, trouble breathing.    Blurred vision that does not go away after the patch is removed    Confusion or memory loss    Fast,slow, or uneven heartbeat    Lightheadedness, dizziness, drowsiness, or fainting    Seeing, hearing, or feeling things that are not there    Restlessness    Severe eye pain    Trouble urinating    If you notice other side effects that you think are caused by this medicine, call your doctor immediately.        [facility]  Home Care Following Anesthesia  For 24 hours after surgery:  1. Get plenty of rest.  A responsible adult must stay with you for at least 24 hours after you leave the surgery center.  2. Do not drive or use heavy equipment.  If you have weakness or tingling, don't drive or use heavy equipment until this feeling goes away.   3. Do not drink alcohol.   4. Avoid strenuous or risky activities.  Ask for help when climbing stairs.  5. You may feel lightheaded.   IF so, sit for a few minutes before standing.  Have someone help you get up.   6. If you have nausea (feel sick to your stomach): Drink only clear liquids such as apple juice, ginger ale, broth or 7-Up.  Rest may also help.  Be sure to drink enough fluids.  Move to a regular diet as you feel able.   7. You may have a slight fever.  Call the doctor if your fever is over 100 F (37.7 C) (taken under the tongue) or lasts longer than 24 hours.  8. You may have a dry mouth, a sore throat, muscle aches or trouble sleeping. These should go away after 24 hours.  9. Do not make important or legal decisions.               Tips for taking pain medications  To get the best pain relief possible, remember these points:    Take pain medications as directed, before pain becomes severe.    Pain medication can upset your stomach: taking it with food may help.    Constipation is a common side effect of pain medication. Drink plenty of  fluids.    Eat foods high in fiber. Take a stool softener if recommended by your doctor or pharmacist.    Do not drink alcohol, drive or operate machinery while taking pain medications.    Ask about other ways to control pain, such as with heat, ice or relaxation.    Tylenol/Acetaminophen Consumption  To help encourage the safe use of acetaminophen, the makers of TYLENOL  have lowered the maximum daily dose for single-ingredient Extra Strength TYLENOL  (acetaminophen) products sold in the [place] from 8 pills per day (4,000 mg) to 6 pills per day (3,000 mg). The dosing interval has also changed from 2 pills every 4-6 hours to 2 pills every 6 hours.    If you feel your pain relief is insufficient, you may take Tylenol/Acetaminophen in addition to your narcotic pain medication.     Be careful not to exceed 3,000 mg of Tylenol/Acetaminophen in a 24 hour period from all sources.    If you are taking extra strength Tylenol/acetaminophen (500 mg), the maximum dose is 6 tablets in 24 hours.    If you are taking  "regular strength acetaminophen (325 mg), the maximum dose is 9 tablets in 24 hours.    Call a doctor for any of the followin. Signs of infection (fever, growing tenderness at the surgery site, a large amount of drainage or bleeding, severe pain, foul-smelling drainage, redness, swelling).  2. It has been over 8 to 10 hours since surgery and you are still not able to urinate (pass water).  3. Headache for over 24 hours.  4. Numbness, tingling or weakness the day after surgery (if you had spinal anesthesia).  Your doctor is:  Dr. Honey Neil, ENT Otolaryngology: [phone]                    Or dial [phone] and ask for the resident on call for:  ENT Otolaryngology  For emergency care, call the:  [facility]:  [phone] (TTY for hearing impaired: [phone])                Pending Results     No orders found from 10/30/2017 to 2017.            Admission Information     Date & Time Provider Department Dept. Phone    2017 Honey Neil MD [facility] [phone]      Your Vitals Were     Blood Pressure Pulse Temperature Respirations Height Weight    125/81 74 99.2  F (37.3  C) (Temporal) 18 1.918 m (6' 3.5\") 150.1 kg (331 lb)    Pulse Oximetry BMI (Body Mass Index)                95% 40.83 kg/m2          [facility] Information     [facility] gives you secure access to your electronic health record. If you see a primary care provider, you can also send messages to your care team and make appointments. If you have questions, please call your primary care clinic.  If you do not have a primary care provider, please call [phone] and they will assist you.      [facility] is an electronic gateway that provides easy, online access to your medical records. With [facility], you can request a clinic appointment, read your test results, renew a prescription or communicate with your care team.     To access your existing account, please contact your " [facility] or call [phone] for assistance.        Care EveryWhere ID     This is your Care EveryWhere ID. This could be used by other organizations to access your [facility] medical records  [id number]        Equal Access to Services     FLORENTINO KIM : Hadii aad ku haddarlenerafat Krish, waaxda luqadaha, qaybta kaalmada adesalvador, marleny gutierrezvanessa leoemiliano rouse denny fitzpatrick. So [facility] [phone].    ATENCIÓN: Si habla español, tiene a dee disposición servicios gratuitos de asistencia lingüística. Llame al [phone].    We comply with applicable federal civil rights laws and [place] laws. We do not discriminate on the basis of race, color, national origin, age, disability, sex, sexual orientation, or gender identity.               Review of your medicines      START taking        Dose / Directions    HYDROcodone-acetaminophen 5-325 MG per tablet   Commonly known as:  NORCO   Used for:  Postoperative pain        Dose:  1-2 tablet   Take 1-2 tablets by mouth every 4 hours as needed for moderate to severe pain maximum 10 tablet(s) per day   Quantity:  30 tablet   Refills:  0       senna-docusate 8.6-50 MG per tablet   Commonly known as:  SENOKOT-S;PERICOLACE   Used for:  Postoperative pain        Dose:  1-2 tablet   Take 1-2 tablets by mouth 2 times daily as needed for constipation Take while on oral narcotics to prevent or treat constipation.   Quantity:  30 tablet   Refills:  0       sulfamethoxazole-trimethoprim 800-160 MG per tablet   Commonly known as:  BACTRIM DS/SEPTRA DS   Used for:  Postoperative pain        Dose:  1 tablet   Take 1 tablet by mouth 2 times daily   Quantity:  14 tablet   Refills:  0         CONTINUE these medicines which have NOT CHANGED        Dose / Directions    OMEPRAZOLE PO        Take by mouth daily as needed   Refills:  0         STOP taking     IBUPROFEN PO                Where to get your medicines      These medications were sent to [facility]  [place], [place] - [address]  [address], [facility] [address]    Hours:  TRANSPLANT PHONE NUMBER [phone] Phone:  [phone]     senna-docusate 8.6-50 MG per tablet    sulfamethoxazole-trimethoprim 800-160 MG per tablet         Some of these will need a paper prescription and others can be bought over the counter. Ask your nurse if you have questions.     Bring a paper prescription for each of these medications     HYDROcodone-acetaminophen 5-325 MG per tablet               ANTIBIOTIC INSTRUCTION     You've Been Prescribed an Antibiotic - Now What?  Your healthcare team thinks that you or your loved one might have an infection. Some infections can be treated with antibiotics, which are powerful, life-saving drugs. Like all medications, antibiotics have side effects and should only be used when necessary. There are some important things you should know about your antibiotic treatment.      Your healthcare team may run tests before you start taking an antibiotic.    Your team may take samples (e.g., from your blood, urine or other areas) to run tests to look for bacteria. These test can be important to determine if you need an antibiotic at all and, if you do, which antibiotic will work best.      Within a few days, your healthcare team might change or even stop your antibiotic.    Your team may start you on an antibiotic while they are working to find out what is making you sick.    Your team might change your antibiotic because test results show that a different antibiotic would be better to treat your infection.    In some cases, once your team has more information, they learn that you do not need an antibiotic at all. They may find out that you don't have an infection, or that the antibiotic you're taking won't work against your infection. For example, an infection caused by a virus can't be treated with antibiotics. Staying on an antibiotic when you don't  need it is more likely to be harmful than helpful.      You may experience side effects from your antibiotic.    Like all medications, antibiotics have side effects. Some of these can be serious.    Let you healthcare team know if you have any known allergies when you are admitted to the hospital.    One significant side effect of nearly all antibiotics is the risk of severe and sometimes deadly diarrhea caused by Clostridium difficile (C. Difficile). This occurs when a person takes antibiotics because some good germs are destroyed. Antibiotic use allows C. diificile to take over, putting patients at high risk for this serious infection.    As a patient or caregiver, it is important to understand your or your loved one's antibiotic treatment. It is especially important for caregivers to speak up when patients can't speak for themselves. Here are some important questions to ask your healthcare team.    What infection is this antibiotic treating and how do you know I have that infection?    What side effects might occur from this antibiotic?    How long will I need to take this antibiotic?    Is it safe to take this antibiotic with other medications or supplements (e.g., vitamins) that I am taking?     Are there any special directions I need to know about taking this antibiotic? For example, should I take it with food?    How will I be monitored to know whether my infection is responding to the antibiotic?    What tests may help to make sure the right antibiotic is prescribed for me?      Information provided by:  www.cdc.gov/getsmart  U.S. Department of Health and Human Services  Centers for disease Control and Prevention  National Center for Emerging and Zoonotic Infectious Diseases  Division of Healthcare Quality Promotion         Protect others around you: Learn how to safely use, store and throw away your medicines at www.disposemymeds.org.             Medication List: This is a list of all your medications and  when to take them. Check marks below indicate your daily home schedule. Keep this list as a reference.      Medications           Morning Afternoon Evening Bedtime As Needed    HYDROcodone-acetaminophen 5-325 MG per tablet   Commonly known as:  NORCO   Take 1-2 tablets by mouth every 4 hours as needed for moderate to severe pain maximum 10 tablet(s) per day                                OMEPRAZOLE PO   Take by mouth daily as needed                                senna-docusate 8.6-50 MG per tablet   Commonly known as:  SENOKOT-S;PERICOLACE   Take 1-2 tablets by mouth 2 times daily as needed for constipation Take while on oral narcotics to prevent or treat constipation.                                sulfamethoxazole-trimethoprim 800-160 MG per tablet   Commonly known as:  BACTRIM DS/SEPTRA DS   Take 1 tablet by mouth 2 times daily

## 2017-11-02 ENCOUNTER — CARE COORDINATION (OUTPATIENT)
Dept: OTOLARYNGOLOGY | Facility: CLINIC | Age: 27
End: 2017-11-02

## 2017-11-02 NOTE — PROGRESS NOTES
"ENT Discharge Follow-Up      Responsible Attending Physician: Dr. Neil  Date of Discharge:  11/1/17  Discharge to:  Home    Current Status:  Pt is a 26 y/o male s/p Left post auricular backed Cartilage Tympanoplasty,  with Diode Laser  on 11/1/17.  Operative  pain is decreasing.  Ambulating without assistance.  Pain well controlled with current meds, ample supply.  Denies current bowel or bladder issues.  Patient does report that the gauze under his Kenan dressing is \"soaked\" with bloody drainage and the drainage is going through onto the Rowan. Discussed with Dr. Neil and we will have patient change the gauze underneath the Rowan dressing and replace the Rowan over the gauze. Patient advised that if the gauze gets wet again to call the clinic. He will call with an update later today or tomorrow am. Patient verbalized understanding and was encouraged to call with further questions or concerns.      Discharge instructions and medication use were reviewed.  RN triage/on call number given:  [phone] or after hours and w/e - [phone].  Patient verbalized understanding and agreement with current plan.    Follow up appointments/imaging/tests needed: 11/21 at 11:10am for post-op with Dr. Neil.     Angela Love, RN, BSN      "

## 2017-11-03 ENCOUNTER — CARE COORDINATION (OUTPATIENT)
Dept: OTOLARYNGOLOGY | Facility: CLINIC | Age: 27
End: 2017-11-03

## 2017-11-03 NOTE — PROGRESS NOTES
Called to check in with patient and see if his drainage onto his gauze has slowed. Patient indicates that after changing the dressing as advised yesterday, there has been little to no drainage onto the new gauze. Patient instructed to continue to monitor and call with any further questions or concerns. Reviewed with patient that he may remove the Kenan dressing as instructed at discharge after 48 hours, but he is to remain with dry ear precautions which were again reviewed with patient and wife. Both patient and wife verbalized understanding.     Angela Love, RN, BSN

## 2017-11-06 NOTE — OP NOTE
DATE OF PROCEDURE:  11/01/2017      PREOPERATIVE DIAGNOSIS:  Subtotal left tympanic membrane perforation with retraction onto incudostapedial joint and conductive hearing loss.        POSTOPERATIVE DIAGNOSIS:  Subtotal tympanic membrane perforation, left ear, with attenuation of incudostapedial joint.      PROCEDURE:     1.  Left postauricular cartilage-backed tympanoplasty and middle ear dissection using diode laser.     2.  Facial nerve monitoring.      ATTENDING SURGEON:  Honey Neil MD      RESIDENT SURGEON:  Catherine Rhodes MD      ANESTHESIA:  General.      ESTIMATED BLOOD LOSS:  10 mL.      INDICATIONS FOR PROCEDURE:  Mr. Morrow is a 27-year-old man who has had prior surgeries on the left ear related to chronic otitis media.  Recently, he has been challenged with recurrent infections from the left ear that have required antibiotic therapy and additionally has conductive hearing loss.  We discussed the common and serious risks of tympanoplasty in detail.  We explained that the goal of this procedure is to close the tympanic membrane and that we may or may not be able to improve hearing.      INTRAOPERATIVE FINDINGS:  The perforation involved most of the tympanic membrane.  The residual portions spanned from the anterior aspect of the malleus up to the anterior annulus but there was a separate hole that appeared to be tubed shaped in the anterior superior aspect that abutted the anterior superior annulus.  The whole inferior tympanic membrane was absent.  Posteriorly there were strips of  tympanic membrane that were retracted down onto the incudostapedial complex and very scarred to this region and these were lifted with the laser and the incus is connected to the stapes, although its attachment is attenuated given the retraction.  Moving the very medially rotated malleus that has a slight gap between its umbo and the promontory did result in incudostapedial motion, and thus, this was left  intact.  The chorda tympani nerve is tucked superiorly and was left as is, although it is not clear if previous surgeries have disrupted it, but we can see its posterior aspect and left it in place.  There is no evidence of cholesteatoma ingrowth.      DESCRIPTION OF PROCEDURE:  The patient was brought to the operating room, placed supine on the operating room table.  General endotracheal anesthesia was administered, the table was turned 180 degrees.  Facial nerve monitoring electrodes were placed within the left orbicularis oculi and orbicularis oris with a ground in the shoulder.  Tap test was performed and impedances were checked and verified.  Continuous facial nerve monitoring was performed throughout the procedure.  The postauricular incision was designed and instilled with 1% lidocaine with 1:100,000 epinephrine and the ear was prepped and draped in the usual sterile fashion.  The procedure began underneath the otomicroscope and visualized the ear canal as noted above and instilled 1% lidocaine with 1:100,000 epinephrine into the ear canal itself.  The incision was then carried down through the skin and the subcutaneous tissue to the level of temporalis.  Some tissue may have been harvested previously, but we identified a dense layer of areolar tissue that was harvested and was set to dry for later use.  There is a thin layer of fascia that were remaining on portions of the muscle that could be used later if necessary.  The mastoid periosteum was incised and the cortex was exposed.  There is a prominent spine of Henle which was removed with curetting with effort.  The skin was elevated from the posterior ear canal and vascular strip incised and reflected forward.  There is a prominent tympanosquamous suture which was also removed with a curet.  The tympanomeatal flap was then elevated, the middle ear space entered and the perimeter of the residual tympanic membrane was viewed.  It comprised some of the  annulus and so a needle was used to divide these layers, leaving the lateral aspect and taking the medial aspect and then removing this to create a fresh edge anteriorly and then posteriorly the drum was so affixed to the incudostapedial complex it could not be elevated with regular instruments.  Therefore, the laser was brought in and at 500 rose, single pulse mode was used to gently dissect the drum free from the incudostapedial complex and then up off of a portion of the malleus umbo as well to confirm that there were no squamous elements in these regions.  The chorda tympani nerve was observed and left in place but some curetting of the scutum had to be performed to see the incudostapedial joint better before the lasering was performed and the chorda was preserved in that region.  The entire area was copiously irrigated with saline and a tragal cartilage graft was then harvested.  A separate incision was made on the tragus and carried down to the level of the perichondrium.  Dissection was carried out on the posterior aspect of the tragal cartilage.  The cartilage was incised leaving a lateral 3 mm for cosmesis and then the anterior aspect was dissected and the medial tragal cartilage was harvested.  I fashioned 2 semicircular grafts with perichondrium and trimmed these in the cartilage knife to achieve appropriate thickness.  Then a portion of the tragus that was not used was placed back into the tragal pocket after it was irrigated copiously and this was then closed with 5-0 chromic sutures.  The grafts were very carefully trimmed to size using templates of tympanic membrane and then partly hand thinned on one of them to get it a very thin thickness but still to give tenacity to the tympanic membrane.  These were rinsed and then put back underneath the microscope.  Gelfoam was placed within the middle ear and the areolar tissue graft was put in an underlay technique medial to the malleus and tucked underneath  the residual anterior superior lip of the tympanic membrane.  After it was tucked, we elevated again and placed the cartilage medial to this and then tucked all of these nicely with extra Gelfoam medial to them and then placed Gelfoam in the posterior half of the middle ear and place the cartilage graft to fill in the posterior half of the tympanic membrane and then draped the areolar tissue up the canal wall and placed the residual drum as well as the skin edges lateral to this and secured all this with Gelfoam.  The vascular strip was unfurled.  The mastoid periosteum was closed with 3-0 Vicryl sutures and then the vascular strip was carefully laid out in the ear canal and the whole ear canal packed with Gelfoam and then the incision was closed in multiple layers with 3-0 Vicryl sutures followed by Steri-Strips.  Kenan dressing was secured.  The patient was then turned back over to Anesthesia in stable condition.         MICAELA AYALA MD             D: 2017 14:21   T: 2017 15:13   MT: gunjan      Name:     FREDDIE DONNELLY   MRN:      [id number]        Account:        [id number]   :      1990           Procedure Date: 2017      Document: [id number]

## 2017-11-21 ENCOUNTER — OFFICE VISIT (OUTPATIENT)
Dept: OTOLARYNGOLOGY | Facility: CLINIC | Age: 27
End: 2017-11-21

## 2017-11-21 VITALS — WEIGHT: 315 LBS | BODY MASS INDEX: 38.36 KG/M2 | HEIGHT: 76 IN

## 2017-11-21 DIAGNOSIS — H92.12 OTORRHEA, LEFT: Primary | ICD-10-CM

## 2017-11-21 PROBLEM — K21.00 GASTROESOPHAGEAL REFLUX DISEASE WITH ESOPHAGITIS: Status: ACTIVE | Noted: 2017-07-25

## 2017-11-21 PROBLEM — F17.200 SMOKER: Status: ACTIVE | Noted: 2017-07-25

## 2017-11-21 PROBLEM — E66.3 OVERWEIGHT: Status: ACTIVE | Noted: 2017-07-25

## 2017-11-21 RX ORDER — ACETAMINOPHEN 500 MG
500-1000 TABLET ORAL
COMMUNITY

## 2017-11-21 RX ORDER — IBUPROFEN 800 MG/1
800 TABLET, FILM COATED ORAL
COMMUNITY
Start: 2017-08-18

## 2017-11-21 RX ORDER — OMEGA-3 FATTY ACIDS/FISH OIL 300-1000MG
400 CAPSULE ORAL
COMMUNITY

## 2017-11-21 RX ORDER — CIPROFLOXACIN AND DEXAMETHASONE 3; 1 MG/ML; MG/ML
SUSPENSION/ DROPS AURICULAR (OTIC)
Qty: 7.5 ML | Refills: 0 | Status: SHIPPED | OUTPATIENT
Start: 2017-11-21 | End: 2017-12-01

## 2017-11-21 RX ORDER — DEXAMETHASONE SODIUM PHOSPHATE 1 MG/ML
SOLUTION/ DROPS OPHTHALMIC
COMMUNITY
Start: 2017-05-09

## 2017-11-21 ASSESSMENT — PAIN SCALES - GENERAL: PAINLEVEL: NO PAIN (0)

## 2017-11-21 NOTE — PATIENT INSTRUCTIONS
1. Please follow-up in 2-3 weeks. You could follow-up with Dr. Bowden in [place] if you do not want to return to clinic here with Dr. Neil.   2. Please call the ENT clinic with any questions,concerns, new or worsening symptoms.    -Clinic number is [phone]   - Angela's direct line (Dr. Neil' nurse) [phone]    3. Please use the Ciprodex drops and place 4 drops into left ear 2 times per day for 2 weeks.

## 2017-11-21 NOTE — MR AVS SNAPSHOT
After Visit Summary   11/21/2017    Alex Morrow    MRN: [id number]           Patient Information     Date Of Birth          1990        Visit Information        Provider Department      11/21/2017 11:10 AM Honey Neil MD [facility] Ear Nose and Throat        Today's Diagnoses     Ear infection    -  1      Care Instructions    1. Please follow-up in 2-3 weeks. You could follow-up with Dr. Bowden in [place] if you do not want to return to clinic here with Dr. Neil.   2. Please call the ENT clinic with any questions,concerns, new or worsening symptoms.    -Clinic number is [phone]   - Angela's direct line (Dr. Neil' nurse) [phone]    3. Please use the Ciprodex drops and place 4 drops into left ear 2 times per day for 2 weeks.           Follow-ups after your visit        Who to contact     Please call your clinic at [phone] to:    Ask questions about your health    Make or cancel appointments    Discuss your medicines    Learn about your test results    Speak to your doctor   If you have compliments or concerns about an experience at your clinic, or if you wish to file a complaint, please contact [facility] Patient Relations at [phone] or email us at [email or web address]         Additional Information About Your Visit        MyChart Information     [employer] gives you secure access to your electronic health record. If you see a primary care provider, you can also send messages to your care team and make appointments. If you have questions, please call your primary care clinic.  If you do not have a primary care provider, please call [phone] and they will assist you.      [employer] is an electronic gateway that provides easy, online access to your medical records. With [employer], you can request a clinic appointment, read your test results, renew a prescription or communicate with your care team.     To access your existing  "account, please contact your [facility] or call [phone] for assistance.        Care EveryWhere ID     This is your Care EveryWhere ID. This could be used by other organizations to access your [place] medical records  [id number]        Your Vitals Were     Height BMI (Body Mass Index)                1.918 m (6' 3.51\") 40.81 kg/m2           Blood Pressure from Last 3 Encounters:   11/01/17 125/81    Weight from Last 3 Encounters:   11/21/17 (!) 150.1 kg (331 lb)   11/01/17 (!) 150.1 kg (331 lb)   07/18/17 (!) 150.6 kg (332 lb)              Today, you had the following     No orders found for display         Today's Medication Changes          These changes are accurate as of: 11/21/17 11:39 AM.  If you have any questions, ask your nurse or doctor.               Start taking these medicines.        Dose/Directions    ciprofloxacin-dexamethasone otic suspension   Commonly known as:  CIPRODEX   Used for:  Ear infection   Started by:  Honey Neil MD        Instill 4 drops into the affected ear twice daily for 14 days   Quantity:  7.5 mL   Refills:  0            Where to get your medicines      These medications were sent to [id number] - [place], [place] - [address]   [email or web address] [facility] [address]     Phone:  [phone]     ciprofloxacin-dexamethasone otic suspension                Primary Care Provider Office Phone # Fax #    [facility] [phone]       [address] [place] [address]        Equal Access to Services     FLORENTINO KIM AH: Hadii fortino monteroo Sorosalind, waaxda luqadaha, qaybta kaalmada adeegyada, marleny fitzpatrick. So [facility] [phone].    ATENCIÓN: Si habla español, tiene a dee disposición servicios gratuitos de asistencia lingüística. Llame al [phone].    We comply with applicable federal civil rights laws and [place] laws. We do not discriminate on " the basis of race, color, national origin, age, disability, sex, sexual orientation, or gender identity.            Thank you!     Thank you for choosing [facility] EAR NOSE AND THROAT  for your care. Our goal is always to provide you with excellent care. Hearing back from our patients is one way we can continue to improve our services. Please take a few minutes to complete the written survey that you may receive in the mail after your visit with us. Thank you!             Your Updated Medication List - Protect others around you: Learn how to safely use, store and throw away your medicines at www.disposemymeds.org.          This list is accurate as of: 11/21/17 11:39 AM.  Always use your most recent med list.                   Brand Name Dispense Instructions for use Diagnosis    acetaminophen 500 MG tablet    TYLENOL     Take 500-1,000 mg by mouth        ciprofloxacin-dexamethasone otic suspension    CIPRODEX    7.5 mL    Instill 4 drops into the affected ear twice daily for 14 days    Ear infection       dexamethasone 0.1 % ophthalmic solution    DECADRON     PLACE 5 DROPS INTO THE LEFT EAR TWICE DAILY FOR 21 DAYS        HYDROcodone-acetaminophen 5-325 MG per tablet    NORCO    30 tablet    Take 1-2 tablets by mouth every 4 hours as needed for moderate to severe pain maximum 10 tablet(s) per day    Postoperative pain       * ibuprofen 200 MG capsule      Take 400 mg by mouth        * ibuprofen 800 MG tablet    ADVIL/MOTRIN     Take 800 mg by mouth        * OMEPRAZOLE PO      Take by mouth daily as needed        * omeprazole 20 MG CR capsule    priLOSEC     Take 20 mg by mouth        senna-docusate 8.6-50 MG per tablet    SENOKOT-S;PERICOLACE    30 tablet    Take 1-2 tablets by mouth 2 times daily as needed for constipation Take while on oral narcotics to prevent or treat constipation.    Postoperative pain       sulfamethoxazole-trimethoprim 800-160 MG per tablet    BACTRIM DS/SEPTRA DS    14 tablet    Take 1 tablet  by mouth 2 times daily    Postoperative pain       * Notice:  This list has 4 medication(s) that are the same as other medications prescribed for you. Read the directions carefully, and ask your doctor or other care provider to review them with you.

## 2017-11-21 NOTE — LETTER
11/21/2017       RE: Alex Morrow   [address]  [facility] [address]     Dear Colleague,    Thank you for referring your patient, Alex Morrow, to the [facility]. Please see a copy of my visit note below.    SUBJECTIVE:  Alex returns for followup after a Llft postauricular cartilage-backed tympanoplasty and middle ear dissection using diode laser for a subtotal tympanic membrane perforation with attenuation of incudostapedial joint.     In the interval of time Alex is doing well.  He is experiencing no pain or drainage.    EXAM:   Ear incision healing. However, the canal was no longer filled with packing and the material in the canal is purulent. I suctioned it all clean.  The graft is in place.       IMPRESSION: 3 weeks post tympanoplasty.  Wound and incision appear healthy with no skin infection.  The canal is draining.    RECOMMENDATIONS: Continue with ear drops, vasocidin.  Return to see me in 2 to 3 weeks to assess, but asked patient to call if drainage increases.         Again, thank you for allowing me to participate in the care of your patient.      Sincerely,    Honey Neil MD

## 2017-11-27 NOTE — PROGRESS NOTES
SUBJECTIVE:  Alex returns for followup after a Llft postauricular cartilage-backed tympanoplasty and middle ear dissection using diode laser for a subtotal tympanic membrane perforation with attenuation of incudostapedial joint.     In the interval of time Alex is doing well.  He is experiencing no pain or drainage.    EXAM:   Ear incision healing. However, the canal was no longer filled with packing and the material in the canal is purulent. I suctioned it all clean.  The graft is in place.       IMPRESSION: 3 weeks post tympanoplasty.  Wound and incision appear healthy with no skin infection.  The canal is draining.    RECOMMENDATIONS: Continue with ear drops, vasocidin.  Return to see me in 2 to 3 weeks to assess, but asked patient to call if drainage increases.

## 2017-12-12 ENCOUNTER — OFFICE VISIT (OUTPATIENT)
Dept: OTOLARYNGOLOGY | Facility: CLINIC | Age: 27
End: 2017-12-12

## 2017-12-12 DIAGNOSIS — H72.92 PERFORATION OF LEFT TYMPANIC MEMBRANE: Primary | ICD-10-CM

## 2017-12-12 NOTE — PATIENT INSTRUCTIONS
1. Please follow-up in clinic in 4 months with audiogram.   2. Please call the ENT clinic with any questions,concerns, new or worsening symptoms.    -Clinic number is [phone]   - Angela's direct line (Dr. Neil' nurse) [phone]

## 2017-12-12 NOTE — NURSING NOTE
Chief Complaint   Patient presents with     RECHECK     ear drainage     Frances Maguire Medical Assistant

## 2017-12-12 NOTE — LETTER
12/12/2017       RE: Alex Morrow  [address]  [facility] [address]     Dear Colleague,    Thank you for referring your patient, Alex Morrow, to the [facility]. Please see a copy of my visit note below.    I had the pleasure of seeing Mr. Rizo today in followup.      HISTORY OF PRESENT ILLNESS:  He is a 27-year-old man who had a left postauricular cartilage-backed tympanoplasty and middle ear dissection using the Diode laser.  This was for a subtotal tympanic membrane perforation with attenuation of the incudostapedial joint. At his last visit, we were concerned because he had copious drainage from the ear canal and he has a history of MRSA infection.  He has been treated with drops.  We were able to acquire Ciloxan as Vasocidin is on national back order and his insurance did not cover the Ciprodex or other drops that we tried.      Thankfully, his drainage completely cleared up after about a week on the drops and he is feeling much better.  He also feels that his hearing has improved in the ear and he is overall quite pleased.  He is not having pain or other symptomatology.      PHYSICAL EXAMINATION:  He appeared well.  I examined his ears underneath the otomicroscope.  His incision has healed completely and indeed the ear canal is now completely dry.  There is some debris from the drops drying, but that is it.  Then when I look at the tympanic membrane, there are two cartilage graft in place.  The malleus is present and where the drum goes medially to this, there is slight mucosalization of its surface to the portion of the native drum that was left in place.  It is a bit shiny. I suctioned this off and it appears that is going to remain intact but is thin.      IMPRESSION AND PLAN:  Mr. Morrow is healing following his left cartilage backed tympanoplasty.  He has two sheets of cartilage, one anterior and one posterior and where the malleus  dips in the middle, there is slight mucosalization.  We will need to keep an eye on this to ensure that there is not further retraction as he does have eustachian tube dysfunction bilaterally.  He is going to keep his ear dry for now.  He will use his drops for one more week.  He is going to come back to see me in clinic with an audiogram in about four months from now.      MA/ms           Again, thank you for allowing me to participate in the care of your patient.      Sincerely,    Honey Neil MD

## 2017-12-12 NOTE — MR AVS SNAPSHOT
After Visit Summary   12/12/2017    Alex Morrow    MRN: [id number]           Patient Information     Date Of Birth          1990        Visit Information        Provider Department      12/12/2017 12:20 PM Honey Neil MD [facility]        Care Instructions    1. Please follow-up in clinic in 4 months with audiogram.   2. Please call the ENT clinic with any questions,concerns, new or worsening symptoms.    -Clinic number is [phone]   - Angela's direct line (Dr. Neil' nurse) [phone]              Follow-ups after your visit        Your next 10 appointments already scheduled     Apr 13, 2018  9:30 AM CDT   Walk In From ENT with Kevon Joseph   [facility] ([facility])    [address] [place] [id number] [phone]            Apr 13, 2018 10:50 AM CDT   (Arrive by 10:35 AM)   Return Visit with Honey Neil MD   [facility] ([facility])    [address] [place] [id number] [phone]              Who to contact     Please call your clinic at [phone] to:    Ask questions about your health    Make or cancel appointments    Discuss your medicines    Learn about your test results    Speak to your doctor   If you have compliments or concerns about an experience at your clinic, or if you wish to file a complaint, please contact [facility] Patient Relations at [phone] or email us at [email or web address]         Additional Information About Your Visit        MyChart Information     [employer] gives you secure access to your electronic health record. If you see a primary care provider, you can also send messages to your care team and make appointments. If you have questions, please call your primary care clinic.  If you do not have a primary care provider, please call  [phone] and they will assist you.      [employer] is an electronic gateway that provides easy, online access to your medical records. With [employer], you can request a clinic appointment, read your test results, renew a prescription or communicate with your care team.     To access your existing account, please contact your [facility] or call [phone] for assistance.        Care EveryWhere ID     This is your Care EveryWhere ID. This could be used by other organizations to access your [place] medical records  [id number]         Blood Pressure from Last 3 Encounters:   11/01/17 125/81    Weight from Last 3 Encounters:   11/21/17 (!) 150.1 kg (331 lb)   11/01/17 (!) 150.1 kg (331 lb)   07/18/17 (!) 150.6 kg (332 lb)              Today, you had the following     No orders found for display       Primary Care Provider Office Phone # Fax #    [facility] [phone]       [address] [place] [id number]        Equal Access to Services     FLORENTINO KIM : Hadii fortino ku hadasho Soomaali, waaxda luqadaha, qaybta kaalmada adeegyada, marleny castillo haynicola baldwin . So [facility] [phone].    ATENCIÓN: Si habla español, tiene a dee disposición servicios gratuitos de asistencia lingüística. Llame al [phone].    We comply with applicable federal civil rights laws and [place] laws. We do not discriminate on the basis of race, color, national origin, age, disability, sex, sexual orientation, or gender identity.            Thank you!     Thank you for choosing [facility]  for your care. Our goal is always to provide you with excellent care. Hearing back from our patients is one way we can continue to improve our services. Please take a few minutes to complete the written survey that you may receive in the mail after your visit with us. Thank you!             Your Updated Medication List - Protect others around you: Learn  how to safely use, store and throw away your medicines at www.disposemymeds.org.          This list is accurate as of: 12/12/17 12:52 PM.  Always use your most recent med list.                   Brand Name Dispense Instructions for use Diagnosis    acetaminophen 500 MG tablet    TYLENOL     Take 500-1,000 mg by mouth        dexamethasone 0.1 % ophthalmic solution    DECADRON     PLACE 5 DROPS INTO THE LEFT EAR TWICE DAILY FOR 21 DAYS        HYDROcodone-acetaminophen 5-325 MG per tablet    NORCO    30 tablet    Take 1-2 tablets by mouth every 4 hours as needed for moderate to severe pain maximum 10 tablet(s) per day    Postoperative pain       * ibuprofen 200 MG capsule      Take 400 mg by mouth        * ibuprofen 800 MG tablet    ADVIL/MOTRIN     Take 800 mg by mouth        * OMEPRAZOLE PO      Take by mouth daily as needed        * omeprazole 20 MG CR capsule    priLOSEC     Take 20 mg by mouth        senna-docusate 8.6-50 MG per tablet    SENOKOT-S;PERICOLACE    30 tablet    Take 1-2 tablets by mouth 2 times daily as needed for constipation Take while on oral narcotics to prevent or treat constipation.    Postoperative pain       sulfamethoxazole-trimethoprim 800-160 MG per tablet    BACTRIM DS/SEPTRA DS    14 tablet    Take 1 tablet by mouth 2 times daily    Postoperative pain       * Notice:  This list has 4 medication(s) that are the same as other medications prescribed for you. Read the directions carefully, and ask your doctor or other care provider to review them with you.

## 2017-12-13 NOTE — PROGRESS NOTES
I had the pleasure of seeing Mr. Rizo today in followup.      HISTORY OF PRESENT ILLNESS:  He is a 27-year-old man who had a left postauricular cartilage-backed tympanoplasty and middle ear dissection using the Diode laser.  This was for a subtotal tympanic membrane perforation with attenuation of the incudostapedial joint. At his last visit, we were concerned because he had copious drainage from the ear canal and he has a history of MRSA infection.  He has been treated with drops.  We were able to acquire Ciloxan as Vasocidin is on national back order and his insurance did not cover the Ciprodex or other drops that we tried.      Thankfully, his drainage completely cleared up after about a week on the drops and he is feeling much better.  He also feels that his hearing has improved in the ear and he is overall quite pleased.  He is not having pain or other symptomatology.      PHYSICAL EXAMINATION:  He appeared well.  I examined his ears underneath the otomicroscope.  His incision has healed completely and indeed the ear canal is now completely dry.  There is some debris from the drops drying, but that is it.  Then when I look at the tympanic membrane, there are two cartilage graft in place.  The malleus is present and where the drum goes medially to this, there is slight mucosalization of its surface to the portion of the native drum that was left in place.  It is a bit shiny. I suctioned this off and it appears that is going to remain intact but is thin.      IMPRESSION AND PLAN:  Mr. Morrow is healing following his left cartilage backed tympanoplasty.  He has two sheets of cartilage, one anterior and one posterior and where the malleus dips in the middle, there is slight mucosalization.  We will need to keep an eye on this to ensure that there is not further retraction as he does have eustachian tube dysfunction bilaterally.  He is going to keep his ear dry for now.  He will use his drops for one more week.   He is going to come back to see me in clinic with an audiogram in about four months from now.      MA/ms

## 2019-09-28 ENCOUNTER — HEALTH MAINTENANCE LETTER (OUTPATIENT)
Age: 29
End: 2019-09-28

## 2021-01-10 ENCOUNTER — HEALTH MAINTENANCE LETTER (OUTPATIENT)
Age: 31
End: 2021-01-10

## 2021-10-23 ENCOUNTER — HEALTH MAINTENANCE LETTER (OUTPATIENT)
Age: 31
End: 2021-10-23

## 2022-02-12 ENCOUNTER — HEALTH MAINTENANCE LETTER (OUTPATIENT)
Age: 32
End: 2022-02-12

## 2022-10-10 ENCOUNTER — HEALTH MAINTENANCE LETTER (OUTPATIENT)
Age: 32
End: 2022-10-10

## 2023-02-18 ENCOUNTER — HEALTH MAINTENANCE LETTER (OUTPATIENT)
Age: 33
End: 2023-02-18

## 2024-03-16 ENCOUNTER — HEALTH MAINTENANCE LETTER (OUTPATIENT)
Age: 34
End: 2024-03-16

## (undated) DEVICE — SPECIMEN CONTAINER 5OZ STERILE 2600SA

## (undated) DEVICE — LINEN GOWN XLG 5407

## (undated) DEVICE — DRSG COTTON BALL 6PK LCB62

## (undated) DEVICE — SOL NACL 0.9% IRRIG 1000ML BOTTLE 2F7124

## (undated) DEVICE — PREP POVIDONE IODINE SOLUTION 10% 120ML

## (undated) DEVICE — ESU GROUND PAD ADULT W/CORD E7507

## (undated) DEVICE — DRSG TEGADERM 2 3/8X2 3/4" 1624W

## (undated) DEVICE — SU CHROMIC 5-0 P-3 18" 687G

## (undated) DEVICE — SPONGE SURGIFOAM 100 1974

## (undated) DEVICE — ADH LIQUID MASTISOL TOPICAL VIAL 2-3ML 0523-48

## (undated) DEVICE — SU VICRYL 3-0 RB-1 27" UND J215H

## (undated) DEVICE — WIPE INSTRUMENT MEROCEL 400200

## (undated) DEVICE — BLADE KNIFE BEAVER MINI BEAVER6400

## (undated) DEVICE — RX BACITRACIN OINTMENT 0.9G 1/32OZ CUR001109

## (undated) DEVICE — PREP PAD ALCOHOL 6818

## (undated) DEVICE — SOL NACL 0.9% IRRIG 3000ML BAG 2B7477

## (undated) DEVICE — SOL WATER IRRIG 1000ML BOTTLE 2F7114

## (undated) DEVICE — GELFILM 12.5 SQ SM 1X2"

## (undated) DEVICE — PROBE OTO SHORT ANGLED 14320-1

## (undated) DEVICE — DRAPE EAR CHRONIC LATEX FREE 3609

## (undated) DEVICE — DRAPE MICROSCOPE LEICA 54X150" AR8033650

## (undated) DEVICE — GLOVE LINER FINGER 1/2

## (undated) DEVICE — PACK ENT MINOR CUSTOM ASC

## (undated) DEVICE — NDL 27GA 1.25" 305136

## (undated) DEVICE — DRAPE WARMER 66X44" ORS-300

## (undated) DEVICE — NDL ANGIOCATH 14GA 1.25" 4048

## (undated) DEVICE — BLADE KNIFE BEAVER TYMPANOPLASTY 2.5MM W/60D DOWN 377200

## (undated) DEVICE — NIM ELEC SUBDERMAL NDL 3PAIR/BOX

## (undated) DEVICE — DRSG BANDAID 1X3" FABRIC CURITY LATEX FREE KC44101

## (undated) DEVICE — SUCTION MANIFOLD NEPTUNE 2 SYS 4 PORT 0702-020-000

## (undated) DEVICE — SYR 01ML TBC SLIP TIP W/O NDL

## (undated) DEVICE — PREP POVIDONE IODINE SCRUB 7.5% 120ML

## (undated) DEVICE — LABEL MEDICATION SYSTEM 3303-P

## (undated) DEVICE — DRAPE STERI TOWEL SM 1000

## (undated) DEVICE — GLOVE GAMMEX NEOPRENE ULTRA SZ 7 LF 8514

## (undated) DEVICE — DRAPE STOCKINETTE IMPERVIOUS 10" 21048

## (undated) DEVICE — SU UMBILICAL TAPE .125X30" U11T

## (undated) DEVICE — ESU ELEC BLADE 2.75" COATED/INSULATED E1455

## (undated) DEVICE — PREP SKIN SCRUB TRAY 4461A

## (undated) DEVICE — SYR 10ML LL W/O NDL

## (undated) DEVICE — LINEN TOWEL PACK X5 5464

## (undated) DEVICE — TONGUE DEPRESSOR STERILE 25-705-ALL

## (undated) DEVICE — PACK EAR CUSTOM ASC

## (undated) DEVICE — SU CHROMIC 4-0 RB-1 27" U203H

## (undated) RX ORDER — REMIFENTANIL HYDROCHLORIDE 1 MG/ML
INJECTION, POWDER, LYOPHILIZED, FOR SOLUTION INTRAVENOUS
Status: DISPENSED
Start: 2017-11-01

## (undated) RX ORDER — PROPOFOL 10 MG/ML
INJECTION, EMULSION INTRAVENOUS
Status: DISPENSED
Start: 2017-11-01

## (undated) RX ORDER — GABAPENTIN 300 MG/1
CAPSULE ORAL
Status: DISPENSED
Start: 2017-11-01

## (undated) RX ORDER — DEXAMETHASONE SODIUM PHOSPHATE 4 MG/ML
INJECTION, SOLUTION INTRA-ARTICULAR; INTRALESIONAL; INTRAMUSCULAR; INTRAVENOUS; SOFT TISSUE
Status: DISPENSED
Start: 2017-11-01

## (undated) RX ORDER — PHENYLEPHRINE HCL IN 0.9% NACL 1 MG/10 ML
SYRINGE (ML) INTRAVENOUS
Status: DISPENSED
Start: 2017-11-01

## (undated) RX ORDER — KETAMINE HYDROCHLORIDE 10 MG/ML
INJECTION, SOLUTION INTRAMUSCULAR; INTRAVENOUS
Status: DISPENSED
Start: 2017-11-01

## (undated) RX ORDER — ACETAMINOPHEN 325 MG/1
TABLET ORAL
Status: DISPENSED
Start: 2017-11-01

## (undated) RX ORDER — ONDANSETRON 2 MG/ML
INJECTION INTRAMUSCULAR; INTRAVENOUS
Status: DISPENSED
Start: 2017-11-01

## (undated) RX ORDER — SCOLOPAMINE TRANSDERMAL SYSTEM 1 MG/1
PATCH, EXTENDED RELEASE TRANSDERMAL
Status: DISPENSED
Start: 2017-11-01

## (undated) RX ORDER — EPHEDRINE SULFATE 50 MG/ML
INJECTION, SOLUTION INTRAMUSCULAR; INTRAVENOUS; SUBCUTANEOUS
Status: DISPENSED
Start: 2017-11-01